# Patient Record
Sex: FEMALE | Race: WHITE | NOT HISPANIC OR LATINO | URBAN - METROPOLITAN AREA
[De-identification: names, ages, dates, MRNs, and addresses within clinical notes are randomized per-mention and may not be internally consistent; named-entity substitution may affect disease eponyms.]

---

## 2017-08-31 ENCOUNTER — OFFICE (OUTPATIENT)
Dept: URBAN - METROPOLITAN AREA CLINIC 19 | Facility: CLINIC | Age: 67
End: 2017-08-31
Payer: COMMERCIAL

## 2017-08-31 VITALS
DIASTOLIC BLOOD PRESSURE: 69 MMHG | RESPIRATION RATE: 16 BRPM | WEIGHT: 185 LBS | SYSTOLIC BLOOD PRESSURE: 131 MMHG | HEIGHT: 63 IN | HEART RATE: 76 BPM

## 2017-08-31 DIAGNOSIS — E66.9 OBESITY, UNSPECIFIED: ICD-10-CM

## 2017-08-31 DIAGNOSIS — Z86.010 PERSONAL HISTORY OF COLONIC POLYPS: ICD-10-CM

## 2017-08-31 DIAGNOSIS — R19.7 DIARRHEA, UNSPECIFIED: ICD-10-CM

## 2017-08-31 PROCEDURE — G8427 DOCREV CUR MEDS BY ELIG CLIN: HCPCS | Performed by: INTERNAL MEDICINE

## 2017-08-31 PROCEDURE — 99213 OFFICE O/P EST LOW 20 MIN: CPT | Performed by: INTERNAL MEDICINE

## 2018-03-20 ENCOUNTER — OFFICE (OUTPATIENT)
Dept: URBAN - METROPOLITAN AREA CLINIC 19 | Facility: CLINIC | Age: 68
End: 2018-03-20
Payer: COMMERCIAL

## 2018-03-20 VITALS
SYSTOLIC BLOOD PRESSURE: 132 MMHG | DIASTOLIC BLOOD PRESSURE: 69 MMHG | HEIGHT: 63 IN | WEIGHT: 193 LBS | HEART RATE: 69 BPM

## 2018-03-20 DIAGNOSIS — R19.7 DIARRHEA, UNSPECIFIED: ICD-10-CM

## 2018-03-20 PROCEDURE — 99213 OFFICE O/P EST LOW 20 MIN: CPT | Performed by: INTERNAL MEDICINE

## 2018-03-20 RX ORDER — COLESEVELAM HYDROCHLORIDE 625 MG/1
2500 TABLET, FILM COATED ORAL
Qty: 120 | Refills: 6 | Status: COMPLETED
Start: 2018-03-20 | End: 2022-01-20

## 2022-01-20 ENCOUNTER — OFFICE (OUTPATIENT)
Dept: URBAN - METROPOLITAN AREA CLINIC 19 | Facility: CLINIC | Age: 72
End: 2022-01-20

## 2022-01-20 VITALS
DIASTOLIC BLOOD PRESSURE: 77 MMHG | WEIGHT: 188 LBS | SYSTOLIC BLOOD PRESSURE: 124 MMHG | OXYGEN SATURATION: 100 % | HEART RATE: 70 BPM | HEIGHT: 63 IN

## 2022-01-20 DIAGNOSIS — Z86.010 PERSONAL HISTORY OF COLONIC POLYPS: ICD-10-CM

## 2022-01-20 DIAGNOSIS — R19.7 DIARRHEA, UNSPECIFIED: ICD-10-CM

## 2022-01-20 PROCEDURE — 99204 OFFICE O/P NEW MOD 45 MIN: CPT | Performed by: INTERNAL MEDICINE

## 2022-01-20 RX ORDER — COLESEVELAM HYDROCHLORIDE 625 MG/1
2500 TABLET, COATED ORAL
Qty: 120 | Refills: 11 | Status: COMPLETED
Start: 2022-01-20 | End: 2022-09-26

## 2022-01-20 RX ORDER — SODIUM PICOSULFATE, MAGNESIUM OXIDE, AND ANHYDROUS CITRIC ACID 10; 3.5; 12 MG/160ML; G/160ML; G/160ML
LIQUID ORAL
Qty: 320 | Refills: 0 | Status: COMPLETED
Start: 2022-01-20 | End: 2022-02-08

## 2022-01-20 NOTE — SERVICEHPINOTES
Mrs. Stinson   Presents with continued diarrhea which she has had for many years.  She is having 10 stools a day unrelated to meals.  She has urgency and occasional incontinence.  She has rare nocturnal symptoms.  The diarrhea can be watery or loose.  Is unrelated to meals.  She has had workup consisting colonoscopy with negative biopsies.  She did have a tubular adenoma and is due for her surveillance colonoscopy.  She was prescribed Welchol last time I saw her in 2018 which was not generic at the time and was not covered by her insurance.

## 2022-01-25 ENCOUNTER — OFFICE (OUTPATIENT)
Dept: URBAN - METROPOLITAN AREA CLINIC 19 | Facility: CLINIC | Age: 72
End: 2022-01-25

## 2022-01-25 LAB
GI PROFILE, STOOL, PCR: ADENOVIRUS F 40/41: NOT DETECTED
GI PROFILE, STOOL, PCR: ASTROVIRUS: NOT DETECTED
GI PROFILE, STOOL, PCR: C DIFFICILE TOXIN A/B: NOT DETECTED
GI PROFILE, STOOL, PCR: CAMPYLOBACTER: NOT DETECTED
GI PROFILE, STOOL, PCR: CRYPTOSPORIDIUM: NOT DETECTED
GI PROFILE, STOOL, PCR: CYCLOSPORA CAYETANENSIS: NOT DETECTED
GI PROFILE, STOOL, PCR: E COLI O157: (no result)
GI PROFILE, STOOL, PCR: ENTAMOEBA HISTOLYTICA: NOT DETECTED
GI PROFILE, STOOL, PCR: ENTEROAGGREGATIVE E COLI: NOT DETECTED
GI PROFILE, STOOL, PCR: ENTEROPATHOGENIC E COLI: NOT DETECTED
GI PROFILE, STOOL, PCR: ENTEROTOXIGENIC E COLI: NOT DETECTED
GI PROFILE, STOOL, PCR: GIARDIA LAMBLIA: NOT DETECTED
GI PROFILE, STOOL, PCR: NOROVIRUS GI/GII: NOT DETECTED
GI PROFILE, STOOL, PCR: PLESIOMONAS SHIGELLOIDES: NOT DETECTED
GI PROFILE, STOOL, PCR: ROTAVIRUS A: NOT DETECTED
GI PROFILE, STOOL, PCR: SALMONELLA: NOT DETECTED
GI PROFILE, STOOL, PCR: SAPOVIRUS: NOT DETECTED
GI PROFILE, STOOL, PCR: SHIGA-TOXIN-PRODUCING E COLI: NOT DETECTED
GI PROFILE, STOOL, PCR: SHIGELLA/ENTEROINVASIVE E COLI: NOT DETECTED
GI PROFILE, STOOL, PCR: VIBRIO CHOLERAE: NOT DETECTED
GI PROFILE, STOOL, PCR: VIBRIO: NOT DETECTED
GI PROFILE, STOOL, PCR: YERSINIA ENTEROCOLITICA: NOT DETECTED

## 2022-02-08 ENCOUNTER — AMBULATORY SURGICAL CENTER (OUTPATIENT)
Dept: URBAN - METROPOLITAN AREA SURGERY 2 | Facility: SURGERY | Age: 72
End: 2022-02-08
Payer: COMMERCIAL

## 2022-02-08 ENCOUNTER — AMBULATORY SURGICAL CENTER (OUTPATIENT)
Dept: URBAN - METROPOLITAN AREA SURGERY 2 | Facility: SURGERY | Age: 72
End: 2022-02-08

## 2022-02-08 ENCOUNTER — OFFICE (OUTPATIENT)
Dept: URBAN - METROPOLITAN AREA PATHOLOGY 22 | Facility: PATHOLOGY | Age: 72
End: 2022-02-08

## 2022-02-08 VITALS
HEART RATE: 80 BPM | TEMPERATURE: 98.2 F | DIASTOLIC BLOOD PRESSURE: 69 MMHG | SYSTOLIC BLOOD PRESSURE: 119 MMHG | HEART RATE: 86 BPM | RESPIRATION RATE: 12 BRPM | WEIGHT: 185 LBS | HEART RATE: 86 BPM | HEIGHT: 63 IN | TEMPERATURE: 97.3 F | SYSTOLIC BLOOD PRESSURE: 117 MMHG | SYSTOLIC BLOOD PRESSURE: 128 MMHG | HEART RATE: 86 BPM | SYSTOLIC BLOOD PRESSURE: 139 MMHG | SYSTOLIC BLOOD PRESSURE: 128 MMHG | HEART RATE: 77 BPM | WEIGHT: 185 LBS | HEIGHT: 63 IN | RESPIRATION RATE: 14 BRPM | HEART RATE: 81 BPM | HEART RATE: 80 BPM | TEMPERATURE: 97.3 F | SYSTOLIC BLOOD PRESSURE: 139 MMHG | TEMPERATURE: 97.3 F | HEART RATE: 81 BPM | RESPIRATION RATE: 14 BRPM | DIASTOLIC BLOOD PRESSURE: 69 MMHG | SYSTOLIC BLOOD PRESSURE: 119 MMHG | RESPIRATION RATE: 12 BRPM | SYSTOLIC BLOOD PRESSURE: 118 MMHG | RESPIRATION RATE: 16 BRPM | OXYGEN SATURATION: 95 % | HEART RATE: 80 BPM | OXYGEN SATURATION: 96 % | RESPIRATION RATE: 16 BRPM | SYSTOLIC BLOOD PRESSURE: 117 MMHG | TEMPERATURE: 98.2 F | DIASTOLIC BLOOD PRESSURE: 71 MMHG | RESPIRATION RATE: 16 BRPM | DIASTOLIC BLOOD PRESSURE: 75 MMHG | HEART RATE: 81 BPM | OXYGEN SATURATION: 96 % | DIASTOLIC BLOOD PRESSURE: 61 MMHG | SYSTOLIC BLOOD PRESSURE: 119 MMHG | RESPIRATION RATE: 14 BRPM | DIASTOLIC BLOOD PRESSURE: 75 MMHG | HEIGHT: 63 IN | SYSTOLIC BLOOD PRESSURE: 118 MMHG | DIASTOLIC BLOOD PRESSURE: 69 MMHG | SYSTOLIC BLOOD PRESSURE: 117 MMHG | OXYGEN SATURATION: 95 % | DIASTOLIC BLOOD PRESSURE: 71 MMHG | SYSTOLIC BLOOD PRESSURE: 128 MMHG | DIASTOLIC BLOOD PRESSURE: 61 MMHG | DIASTOLIC BLOOD PRESSURE: 75 MMHG | TEMPERATURE: 98.2 F | WEIGHT: 185 LBS | DIASTOLIC BLOOD PRESSURE: 71 MMHG | DIASTOLIC BLOOD PRESSURE: 73 MMHG | DIASTOLIC BLOOD PRESSURE: 61 MMHG | HEART RATE: 77 BPM | DIASTOLIC BLOOD PRESSURE: 73 MMHG | SYSTOLIC BLOOD PRESSURE: 118 MMHG | RESPIRATION RATE: 12 BRPM | OXYGEN SATURATION: 95 % | HEART RATE: 77 BPM | SYSTOLIC BLOOD PRESSURE: 139 MMHG | OXYGEN SATURATION: 96 % | DIASTOLIC BLOOD PRESSURE: 73 MMHG

## 2022-02-08 DIAGNOSIS — K52.832 LYMPHOCYTIC COLITIS: ICD-10-CM

## 2022-02-08 DIAGNOSIS — R19.7 DIARRHEA, UNSPECIFIED: ICD-10-CM

## 2022-02-08 PROBLEM — Z86.010 SURVEILLANCE DUE TO PRIOR COLONIC NEOPLASIA: Status: ACTIVE | Noted: 2022-02-08

## 2022-02-08 PROCEDURE — 45380 COLONOSCOPY AND BIOPSY: CPT | Performed by: INTERNAL MEDICINE

## 2022-02-08 PROCEDURE — 88305 TISSUE EXAM BY PATHOLOGIST: CPT | Performed by: PATHOLOGY

## 2022-02-08 PROCEDURE — G8918 PT W/O PREOP ORDER IV AB PRO: HCPCS | Performed by: INTERNAL MEDICINE

## 2022-03-08 ENCOUNTER — OFFICE (OUTPATIENT)
Dept: URBAN - METROPOLITAN AREA CLINIC 19 | Facility: CLINIC | Age: 72
End: 2022-03-08

## 2022-03-08 VITALS
SYSTOLIC BLOOD PRESSURE: 122 MMHG | WEIGHT: 194 LBS | HEART RATE: 60 BPM | OXYGEN SATURATION: 95 % | DIASTOLIC BLOOD PRESSURE: 71 MMHG | HEIGHT: 63 IN

## 2022-03-08 DIAGNOSIS — K52.839 MICROSCOPIC COLITIS, UNSPECIFIED: ICD-10-CM

## 2022-03-08 DIAGNOSIS — K52.832 LYMPHOCYTIC COLITIS: ICD-10-CM

## 2022-03-08 PROCEDURE — 99213 OFFICE O/P EST LOW 20 MIN: CPT | Performed by: NURSE PRACTITIONER

## 2022-09-26 ENCOUNTER — OFFICE (OUTPATIENT)
Dept: URBAN - METROPOLITAN AREA CLINIC 19 | Facility: CLINIC | Age: 72
End: 2022-09-26
Payer: COMMERCIAL

## 2022-09-26 VITALS
SYSTOLIC BLOOD PRESSURE: 124 MMHG | DIASTOLIC BLOOD PRESSURE: 68 MMHG | OXYGEN SATURATION: 100 % | WEIGHT: 197 LBS | HEIGHT: 63 IN | HEART RATE: 68 BPM

## 2022-09-26 DIAGNOSIS — K52.832 LYMPHOCYTIC COLITIS: ICD-10-CM

## 2022-09-26 PROCEDURE — 99214 OFFICE O/P EST MOD 30 MIN: CPT | Performed by: NURSE PRACTITIONER

## 2022-09-26 RX ORDER — MESALAMINE 1.2 G/1
2.4 TABLET, DELAYED RELEASE ORAL
Qty: 180 | Refills: 3 | Status: ACTIVE
Start: 2022-09-26

## 2022-11-09 ENCOUNTER — OFFICE (OUTPATIENT)
Dept: URBAN - METROPOLITAN AREA CLINIC 19 | Facility: CLINIC | Age: 72
End: 2022-11-09

## 2022-11-09 VITALS
WEIGHT: 195 LBS | SYSTOLIC BLOOD PRESSURE: 125 MMHG | DIASTOLIC BLOOD PRESSURE: 70 MMHG | HEART RATE: 72 BPM | OXYGEN SATURATION: 94 % | HEIGHT: 63 IN

## 2022-11-09 DIAGNOSIS — K58.0 IRRITABLE BOWEL SYNDROME WITH DIARRHEA: ICD-10-CM

## 2022-11-09 DIAGNOSIS — K52.832 LYMPHOCYTIC COLITIS: ICD-10-CM

## 2022-11-09 PROCEDURE — 99214 OFFICE O/P EST MOD 30 MIN: CPT | Performed by: NURSE PRACTITIONER

## 2022-11-09 RX ORDER — RIFAXIMIN 550 MG/1
TABLET ORAL
Qty: 50 | Refills: 0 | Status: ACTIVE
Start: 2022-11-09

## 2022-11-09 RX ORDER — MESALAMINE 1.2 G/1
2.4 TABLET, DELAYED RELEASE ORAL
Qty: 180 | Refills: 3 | Status: ACTIVE
Start: 2022-09-26

## 2023-06-13 ENCOUNTER — OFFICE (OUTPATIENT)
Dept: URBAN - METROPOLITAN AREA CLINIC 19 | Facility: CLINIC | Age: 73
End: 2023-06-13

## 2023-06-13 VITALS
HEIGHT: 63 IN | SYSTOLIC BLOOD PRESSURE: 125 MMHG | OXYGEN SATURATION: 100 % | HEART RATE: 67 BPM | DIASTOLIC BLOOD PRESSURE: 68 MMHG | WEIGHT: 193 LBS

## 2023-06-13 DIAGNOSIS — K58.0 IRRITABLE BOWEL SYNDROME WITH DIARRHEA: ICD-10-CM

## 2023-06-13 DIAGNOSIS — K52.832 LYMPHOCYTIC COLITIS: ICD-10-CM

## 2023-06-13 PROCEDURE — 99214 OFFICE O/P EST MOD 30 MIN: CPT | Performed by: NURSE PRACTITIONER

## 2023-06-13 RX ORDER — MESALAMINE 1.2 G/1
2.4 TABLET, DELAYED RELEASE ORAL
Qty: 180 | Refills: 3 | Status: ACTIVE
Start: 2022-09-26

## 2023-06-13 RX ORDER — RIFAXIMIN 550 MG/1
TABLET ORAL
Qty: 50 | Refills: 0 | Status: ACTIVE
Start: 2022-11-09

## 2023-06-13 RX ORDER — HYOSCYAMINE SULFATE 0.38 MG/1
0.75 TABLET ORAL
Qty: 60 | Refills: 11 | Status: ACTIVE
Start: 2023-06-13

## 2023-06-14 LAB — IMMUNOGLOBULIN A, QN, SERUM: 547 MG/DL — HIGH (ref 64–422)

## 2024-12-17 ENCOUNTER — OFFICE (OUTPATIENT)
Dept: URBAN - METROPOLITAN AREA CLINIC 19 | Facility: CLINIC | Age: 74
End: 2024-12-17
Payer: MEDICARE

## 2024-12-17 VITALS
OXYGEN SATURATION: 98 % | DIASTOLIC BLOOD PRESSURE: 64 MMHG | HEIGHT: 63 IN | WEIGHT: 192.4 LBS | HEART RATE: 67 BPM | SYSTOLIC BLOOD PRESSURE: 128 MMHG

## 2024-12-17 DIAGNOSIS — K58.0 IRRITABLE BOWEL SYNDROME WITH DIARRHEA: ICD-10-CM

## 2024-12-17 DIAGNOSIS — K52.832 LYMPHOCYTIC COLITIS: ICD-10-CM

## 2024-12-17 PROCEDURE — 99214 OFFICE O/P EST MOD 30 MIN: CPT | Performed by: NURSE PRACTITIONER

## 2024-12-17 NOTE — SERVICEHPINOTES
Mrs. Stinson   Presents for follow-up on her lymphocytic colitis and IBS D. since starting budesonide 3 mg 3 pills a day, she has had a slight improvement in her bowel habits.  She said that 2 or 3 days out of the week she is having hard small stools.  Two or 3 other days of the week she is having significant diarrhea with urgency and cramping.  At times she is unable to make it to the restroom.  She cannot identify any specific precipitating factors as to the cause of hard stools verses watery loose stools.  The bloating is been improved.  She did notice when she had cream await for breakfast this morning with meld that she did have more diarrhea.  She is currently taking Imodium 2 pills in the morning and 1 pill at night on most days.  If she has hard stools that day, she will cut back on the Imodium.  She is also taking Pepto-Bismol.  She has done pelvic floor therapy for incontinence issues in the past and we discussed trying this again.  She did not complete the pelvic floor therapy due to covid 19  pandemic.

## 2025-02-04 ENCOUNTER — OFFICE (OUTPATIENT)
Dept: URBAN - METROPOLITAN AREA CLINIC 19 | Facility: CLINIC | Age: 75
End: 2025-02-04
Payer: MEDICARE

## 2025-02-04 VITALS
HEIGHT: 63 IN | HEART RATE: 69 BPM | OXYGEN SATURATION: 96 % | SYSTOLIC BLOOD PRESSURE: 124 MMHG | DIASTOLIC BLOOD PRESSURE: 77 MMHG | WEIGHT: 186.4 LBS

## 2025-02-04 DIAGNOSIS — R15.9 FULL INCONTINENCE OF FECES: ICD-10-CM

## 2025-02-04 DIAGNOSIS — K52.832 LYMPHOCYTIC COLITIS: ICD-10-CM

## 2025-02-04 PROCEDURE — 99214 OFFICE O/P EST MOD 30 MIN: CPT | Performed by: NURSE PRACTITIONER

## 2025-05-22 ENCOUNTER — OFFICE (OUTPATIENT)
Dept: URBAN - METROPOLITAN AREA CLINIC 19 | Facility: CLINIC | Age: 75
End: 2025-05-22
Payer: MEDICARE

## 2025-05-22 VITALS
SYSTOLIC BLOOD PRESSURE: 117 MMHG | OXYGEN SATURATION: 98 % | HEART RATE: 68 BPM | HEIGHT: 63 IN | DIASTOLIC BLOOD PRESSURE: 68 MMHG | WEIGHT: 181 LBS

## 2025-05-22 DIAGNOSIS — K52.832 LYMPHOCYTIC COLITIS: ICD-10-CM

## 2025-05-22 PROCEDURE — 99214 OFFICE O/P EST MOD 30 MIN: CPT | Performed by: INTERNAL MEDICINE

## 2025-05-22 RX ORDER — RIFAXIMIN 550 MG/1
1100 TABLET ORAL
Qty: 42 | Refills: 0 | Status: ACTIVE
Start: 2025-05-22